# Patient Record
Sex: MALE | Race: WHITE | NOT HISPANIC OR LATINO | Employment: FULL TIME | URBAN - METROPOLITAN AREA
[De-identification: names, ages, dates, MRNs, and addresses within clinical notes are randomized per-mention and may not be internally consistent; named-entity substitution may affect disease eponyms.]

---

## 2018-11-11 ENCOUNTER — OFFICE VISIT (OUTPATIENT)
Dept: URGENT CARE | Facility: CLINIC | Age: 58
End: 2018-11-11
Payer: COMMERCIAL

## 2018-11-11 ENCOUNTER — HOSPITAL ENCOUNTER (EMERGENCY)
Facility: OTHER | Age: 58
Discharge: HOME | End: 2018-11-11
Attending: EMERGENCY MEDICINE
Payer: COMMERCIAL

## 2018-11-11 VITALS
SYSTOLIC BLOOD PRESSURE: 137 MMHG | WEIGHT: 205 LBS | RESPIRATION RATE: 16 BRPM | BODY MASS INDEX: 27.77 KG/M2 | TEMPERATURE: 98 F | HEIGHT: 72 IN | DIASTOLIC BLOOD PRESSURE: 77 MMHG | OXYGEN SATURATION: 97 % | HEART RATE: 89 BPM

## 2018-11-11 VITALS
BODY MASS INDEX: 28.44 KG/M2 | OXYGEN SATURATION: 98 % | SYSTOLIC BLOOD PRESSURE: 127 MMHG | RESPIRATION RATE: 18 BRPM | DIASTOLIC BLOOD PRESSURE: 74 MMHG | HEART RATE: 77 BPM | TEMPERATURE: 98 F | HEIGHT: 72 IN | WEIGHT: 210 LBS

## 2018-11-11 DIAGNOSIS — M79.89 LEG SWELLING: ICD-10-CM

## 2018-11-11 DIAGNOSIS — M79.89 SWELLING OF RIGHT FOOT: ICD-10-CM

## 2018-11-11 DIAGNOSIS — S80.811A ABRASION OF RIGHT LOWER EXTREMITY, INITIAL ENCOUNTER: ICD-10-CM

## 2018-11-11 DIAGNOSIS — S80.11XA CONTUSION OF RIGHT LOWER LEG, INITIAL ENCOUNTER: ICD-10-CM

## 2018-11-11 DIAGNOSIS — S90.121A CONTUSION OF RIGHT FOOT INCLUDING TOES, INITIAL ENCOUNTER: ICD-10-CM
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
## 2018-11-11 DIAGNOSIS — L03.115 CELLULITIS OF RIGHT LOWER EXTREMITY: ICD-10-CM

## 2018-11-11 DIAGNOSIS — S90.31XA CONTUSION OF RIGHT FOOT INCLUDING TOES, INITIAL ENCOUNTER: ICD-10-CM

## 2018-11-11 DIAGNOSIS — S80.11XA CONTUSION OF MULTIPLE SITES OF RIGHT LOWER EXTREMITY, INITIAL ENCOUNTER: Primary | ICD-10-CM

## 2018-11-11 DIAGNOSIS — M79.661 PAIN AND SWELLING OF LOWER LEG, RIGHT: Primary | ICD-10-CM

## 2018-11-11 DIAGNOSIS — M79.89 PAIN AND SWELLING OF LOWER LEG, RIGHT: Primary | ICD-10-CM

## 2018-11-11 PROCEDURE — 3008F BODY MASS INDEX DOCD: CPT | Mod: CPTII,S$GLB,, | Performed by: NURSE PRACTITIONER

## 2018-11-11 PROCEDURE — 99284 EMERGENCY DEPT VISIT MOD MDM: CPT | Mod: 25

## 2018-11-11 PROCEDURE — 73590 X-RAY EXAM OF LOWER LEG: CPT | Mod: FY,RT,S$GLB, | Performed by: RADIOLOGY

## 2018-11-11 PROCEDURE — 73630 X-RAY EXAM OF FOOT: CPT | Mod: FY,RT,S$GLB, | Performed by: RADIOLOGY

## 2018-11-11 PROCEDURE — 99204 OFFICE O/P NEW MOD 45 MIN: CPT | Mod: S$GLB,,, | Performed by: NURSE PRACTITIONER

## 2018-11-11 RX ORDER — PRAVASTATIN SODIUM 20 MG/1
20 TABLET ORAL NIGHTLY
Refills: 2 | COMMUNITY
Start: 2018-10-04

## 2018-11-11 RX ORDER — METOPROLOL SUCCINATE 50 MG/1
175 TABLET, EXTENDED RELEASE ORAL DAILY
Refills: 3 | COMMUNITY
Start: 2018-10-16

## 2018-11-11 RX ORDER — ERGOCALCIFEROL 1.25 MG/1
50000 CAPSULE ORAL
Refills: 2 | COMMUNITY
Start: 2018-09-15

## 2018-11-11 RX ORDER — WARFARIN SODIUM 5 MG/1
5 TABLET ORAL DAILY
Refills: 3 | COMMUNITY
Start: 2018-08-13

## 2018-11-11 RX ORDER — LISINOPRIL 5 MG/1
2.5 TABLET ORAL DAILY
Refills: 2 | COMMUNITY
Start: 2018-09-04

## 2018-11-11 RX ORDER — CLONAZEPAM 1 MG/1
1 TABLET ORAL 3 TIMES DAILY PRN
Refills: 0 | COMMUNITY
Start: 2018-10-06

## 2018-11-11 RX ORDER — VARDENAFIL HYDROCHLORIDE 20 MG/1
20 TABLET, FILM COATED ORAL
Refills: 6 | COMMUNITY
Start: 2018-10-11

## 2018-11-11 RX ORDER — CLINDAMYCIN HYDROCHLORIDE 150 MG/1
450 CAPSULE ORAL EVERY 8 HOURS
Qty: 45 CAPSULE | Refills: 0 | Status: SHIPPED | OUTPATIENT
Start: 2018-11-11 | End: 2018-11-16

## 2018-11-11 NOTE — PATIENT INSTRUCTIONS
ER Referral   Your condition is serious and requires immediate attention and evaluation in an ER setting.  You were referred to   [facility] at   [address] [place] [address]  [phone].    GO STRAIGHT TO THE ER AND DO NOT EAT OR DRINK ANYTHING UNLESS A HEALTHCARE PROVIDER GIVES IT TO YOU.

## 2018-11-11 NOTE — ED TRIAGE NOTES
"Pt reports was recently on a cruise, reports hitting right leg against a concrete post 1 week ago. Pt reports severe swelling and bruising, hx of coumadin therapy. PT reports his wife "made" him come in to get checked out. Reports going to Urgent Care, advised to come to ED for further evaluation. Pt is AAO x 3, answers questions appropriately  "

## 2018-11-11 NOTE — ED PROVIDER NOTES
Encounter Date: 11/11/2018       History     Chief Complaint   Patient presents with    Leg Swelling     R leg post fall x1 week ago. takes coumadin, injury to R shin, w/ hematoma, reports pain at site of injury, pulses intact, sent from  for DVT r/o     Patient is a 57-year-old male with aortic aneurysm, hyperlipidemia, hypertension, and mechanical heart valve (on Coumadin) who presents to the ED with leg pain and swelling. Patient had a trip and fall from a barricade approximately 7 days ago while he was on a cruise ship.  He has been applying Neosporin to the wound on his shin daily.  Patient reports persistent pain and swelling to his right lower leg.  Patient was seen in urgent care prior to arrival where he had negative right tib-fib and foot x-ray.  He was sent here to rule out a DVT.  He states the pain is worse with elevation of his leg.  He denies numbness or weakness.      The history is provided by the patient.     Review of patient's allergies indicates:  No Known Allergies  Past Medical History:   Diagnosis Date    Anxiety     Aortic aneurysm 2010    Hyperlipidemia     Hypertension     Mechanical heart valve present      Past Surgical History:   Procedure Laterality Date    CARDIAC SURGERY       Family History   Problem Relation Age of Onset    Emphysema Mother     Cancer Father      Social History     Tobacco Use    Smoking status: Never Smoker    Smokeless tobacco: Never Used   Substance Use Topics    Alcohol use: No     Frequency: Never    Drug use: No     Review of Systems   Constitutional: Negative for chills and fever.   HENT: Negative for congestion and sore throat.    Eyes: Negative for pain.   Respiratory: Negative for shortness of breath.    Cardiovascular: Negative for chest pain.   Gastrointestinal: Negative for abdominal pain, diarrhea, nausea and vomiting.   Genitourinary: Negative for dysuria.   Musculoskeletal: Negative for arthralgias.        Right leg pain and swelling    Skin: Positive for wound.   Neurological: Negative for headaches.       Physical Exam     Initial Vitals [11/11/18 1242]   BP Pulse Resp Temp SpO2   134/78 88 18 97.8 °F (36.6 °C) 98 %      MAP       --         Physical Exam    Constitutional: Vital signs are normal. He is cooperative.   HENT:   Head: Normocephalic and atraumatic.   Eyes: EOM are normal. Pupils are equal, round, and reactive to light.   Neck: Normal range of motion. Neck supple.   Cardiovascular: Normal rate, regular rhythm and intact distal pulses.   Pulmonary/Chest: Breath sounds normal. He has no wheezes. He has no rhonchi. He has no rales.   Abdominal: Soft. Bowel sounds are normal. There is no tenderness.   Musculoskeletal:   Right lower leg:  Nonpitting edema noted to right lower leg extending into ankle and foot. No palpable cords.  Negative Julianne's sign. Tib-fib compartment is tender but compressible.  No bony deformity.   Neurological: He is alert and oriented to person, place, and time. GCS eye subscore is 4. GCS verbal subscore is 5. GCS motor subscore is 6.   Strength 5/5 to bilateral lower extremities   Skin: Skin is warm and dry. Capillary refill takes less than 2 seconds.        Psychiatric: He has a normal mood and affect. His behavior is normal.         ED Course   Procedures  Labs Reviewed - No data to display       Imaging Results          US Lower Extremity Veins Right (Final result)  Result time 11/11/18 14:19:41    Final result by Abilio Lepe DO (11/11/18 14:19:41)                 Impression:      No evidence for right lower extremity deep venous thrombosis. Clinical correlation and follow up as clinically warranted.      Electronically signed by: Abilio Lepe DO  Date:    11/11/2018  Time:    14:19             Narrative:    CLINICAL HISTORY:  Other specified soft tissue disorders    TECHNIQUE:  Duplex and color flow Doppler evaluation of the right lower extremity veins was performed.grayscale graded compression,  color-flow and Doppler wave form imaging of the right lower extremity venous system.    COMPARISON:  None    FINDINGS:  The right common femoral, superficial femoral and popliteal veins demonstrate normal gray scale graded compression, color-flow and Doppler wave forms. The Doppler wave form imaging demonstrate normal respiratory phasicity and augmentation. No evidence for right lower extremity deep venous thrombosis.                                 Medical Decision Making:   Initial Assessment:   Urgent evaluation of a 57 y.o. Male presenting to the emergency department complaining of right lower leg pain, swelling, bruising and abrasion X 1 week s/p mechanical fall.  Negative x-rays at urgent care.  Sent from urgent care to rule out DVT.  Patient takes Coumadin daily.  Last INR was normal 2 weeks ago.  Patient is afebrile, nontoxic appearing and hemodynamically stable.  Physical exam outlined above.  I do have white concern for possibly developing cellulitis.  Will obtain ultrasound to rule out DVT.  No evidence of compartment syndrome or septic joint.  ED Management:  Ultrasound reveals no evidence of DVT.  Patient advised that his injuries may take 2-3 weeks to heal.  Will send home with a prescription for clindamycin (no interaction with Coumadin) and he is advised to take this if redness and swelling does not improve in the next 24 hr.  He is advised to follow up with his primary care provider this week for wound recheck.  He is stable for discharge is no other complaints this time.  Other:   I have discussed this case with another health care provider.                      Clinical Impression:     1. Contusion of multiple sites of right lower extremity, initial encounter    2. Leg swelling    3. Cellulitis of right lower extremity                               Jeremías Scott PA-C  11/11/18 [id number]

## 2018-11-11 NOTE — PROGRESS NOTES
Subjective:       Patient ID: Jignesh Robison is a 57 y.o. male.    Vitals:  height is 6' (1.829 m) and weight is 93 kg (205 lb). His temperature is 98.2 °F (36.8 °C). His blood pressure is 137/77 and his pulse is 89. His respiration is 16 and oxygen saturation is 97%.     Chief Complaint: Leg Pain    Pt is here for right lower leg pain. He fell when he was on a cruise and tripped and fell over a concrete barricade.  This happened 7 days ago. He did not hurt anything else, but had a wound on his shin. They put neosporin on the wound and patched it up with a band-aid. His leg had also swollen for the past 7 days. He had bruising initially when he fell, which went away, not he states that his toes are purple-alyssa color and ankle swelling. The wound is now red, swollen, and oozing with discharge. Pt is from [place], and goes back tomorrow via airplane.       Leg Pain    The incident occurred 5 to 7 days ago. The incident occurred in the street. The injury mechanism was a fall. The pain is present in the right ankle and right leg. The quality of the pain is described as aching. The pain is mild. Associated symptoms include tingling. He reports no foreign bodies present.     Review of Systems   Constitution: Negative for chills and fever.   HENT: Negative for sore throat.    Eyes: Negative for blurred vision.   Cardiovascular: Negative for chest pain.   Respiratory: Negative for shortness of breath.    Skin: Positive for color change, poor wound healing and suspicious lesions. Negative for rash.   Musculoskeletal: Positive for joint pain and joint swelling. Negative for back pain.   Gastrointestinal: Negative for abdominal pain, diarrhea, nausea and vomiting.   Neurological: Positive for tingling. Negative for headaches.   Psychiatric/Behavioral: The patient is not nervous/anxious.        Objective:      Physical Exam   Constitutional: He is oriented to person, place, and time. Vital signs are normal. He appears well-developed and  well-nourished. He is cooperative.  Non-toxic appearance. He does not have a sickly appearance. He does not appear ill. No distress.   HENT:   Head: Normocephalic and atraumatic.   Right Ear: Hearing, tympanic membrane, external ear and ear canal normal.   Left Ear: Hearing, tympanic membrane, external ear and ear canal normal.   Nose: Nose normal. No mucosal edema, rhinorrhea or nasal deformity. No epistaxis. Right sinus exhibits no maxillary sinus tenderness and no frontal sinus tenderness. Left sinus exhibits no maxillary sinus tenderness and no frontal sinus tenderness.   Mouth/Throat: Uvula is midline, oropharynx is clear and moist and mucous membranes are normal. No trismus in the jaw. Normal dentition. No uvula swelling. No posterior oropharyngeal erythema.   Eyes: Conjunctivae, EOM and lids are normal. Pupils are equal, round, and reactive to light. Right eye exhibits no discharge. Left eye exhibits no discharge. No scleral icterus.   Sclera clear bilat   Neck: Trachea normal, normal range of motion, full passive range of motion without pain and phonation normal. Neck supple.   Cardiovascular: Normal rate, regular rhythm, S2 normal, normal heart sounds, intact distal pulses and normal pulses.   Pulmonary/Chest: Effort normal and breath sounds normal. No respiratory distress. He has no decreased breath sounds. He has no wheezes. He has no rhonchi. He has no rales.   Abdominal: Soft. Normal appearance and bowel sounds are normal. He exhibits no distension, no pulsatile midline mass and no mass. There is no tenderness.   Musculoskeletal: He exhibits no deformity.        Right ankle: Normal. He exhibits normal range of motion, no swelling, no ecchymosis, no deformity, no laceration and normal pulse. No tenderness. No lateral malleolus and no medial malleolus tenderness found.        Right lower leg: He exhibits tenderness, bony tenderness, swelling and edema. He exhibits no deformity and no laceration.         Legs:       Right foot: There is decreased range of motion, tenderness and swelling. There is no bony tenderness, normal capillary refill, no crepitus, no deformity and no laceration.        Feet:    See attached pictures    Lymphadenopathy:     He has no cervical adenopathy.   Neurological: He is alert and oriented to person, place, and time. He exhibits normal muscle tone. Coordination normal.   Skin: Skin is warm, dry and intact. No rash noted. He is not diaphoretic. No pallor.   Psychiatric: He has a normal mood and affect. His speech is normal and behavior is normal. Judgment and thought content normal. Cognition and memory are normal.   Nursing note and vitals reviewed.               Type of Interpretation: Radiology Verbal Report.  Radiology Procedure Done: Right Lower Leg.  Interpretation: Narrative    EXAMINATION:  XR TIBIA FIBULA 2 VIEW RIGHT    CLINICAL HISTORY:  Abrasion, right lower leg, initial encounter    TECHNIQUE:  AP and lateral radiographs of the tibia and fibula.    COMPARISON:  None.    FINDINGS:  Slight deformity of mid tibial shaft suggesting a remote fracture.  Otherwise osseous structures appear intact.  No acute fracture or focal osseous destructive process. Soft tissue swelling about the ankle.    Impression      Possible remote tibial fracture.  No evidence of acute fracture.    Soft tissue swelling about the ankle.      Electronically signed by: Kenny Melo MD  Date: 11/11/2018  Time: 11:17    XR FOOT COMPLETE 3 VIEW RIGHT   Order: [id number]   Status:  Final result   Visible to patient:  No (Not Released)   Next appt:  None   Dx:  Swelling of right foot   Details       Reading Physician Reading Date Result Priority  Lencho Rodriguez MD 11/11/2018     Narrative    EXAMINATION:  XR FOOT COMPLETE 3 VIEW RIGHT    CLINICAL HISTORY:  . Other specified soft tissue disorders    TECHNIQUE:  AP, lateral, and oblique views of the right foot were  performed.    COMPARISON:  None    FINDINGS:  Mild nonspecific soft tissue edema noted along the dorsal surface of the forefoot.  No acute fractures or dislocations visualized.  Joint spaces are preserved.  No erosive changes demonstrated.Dorsal surface calcaneal enthesophyte noted with mild plantar surface spurring.    Impression      As above.      Electronically signed by: Lencho Rodriguez MD  Date: 11/11/2018  Time: 11:28                    Assessment:       1. Pain and swelling of lower leg, right    2. Abrasion of right lower extremity, initial encounter    3. Fall with injury, initial encounter    4. Swelling of right foot    5. Contusion of right lower leg, initial encounter    6. Contusion of right foot including toes, initial encounter        Plan:       57 year old male with PHM of AA, HTN, HLP and Mechanical Heart Valve arrives with complaint of unilateral pain, bruising and swelling of his right lower leg.  Reports fell over a concrete barricade while on the cruise but continued to visit ports.  No xray completed but treated his leg abrasion with neosporin.  He is concerned about a blood clot because he has a flight out tomorrow.  Informed him we would do an xray but agree with his concern and recommended an ultrasound.    Discussed this case with Dr. Brito who agrees with an follow-up ultrasound today in the ER.      Pain and swelling of lower leg, right  -     Refer to Emergency Dept.    Abrasion of right lower extremity, initial encounter  -     X-Ray Tibia Fibula 2 View Right; Future; Expected date: 11/11/2018    Fall with injury, initial encounter    Swelling of right foot  -     X-Ray Tibia Fibula 2 View Right; Future; Expected date: 11/11/2018  -     XR FOOT COMPLETE 3 VIEW RIGHT; Future; Expected date: 11/11/2018    Contusion of right lower leg, initial encounter    Contusion of right foot including toes, initial encounter      Patient Instructions   ER Referral   Your condition is serious and  requires immediate attention and evaluation in an ER setting.  You were referred to   [facility] at   [address] [place] [address]  [phone].    GO STRAIGHT TO THE ER AND DO NOT EAT OR DRINK ANYTHING UNLESS A HEALTHCARE PROVIDER GIVES IT TO YOU.